# Patient Record
(demographics unavailable — no encounter records)

---

## 2025-02-27 NOTE — REASON FOR VISIT
[Subsequent Evaluation] : a subsequent evaluation for [Father] : father [Other: ______] : provided by J[facility]ENT[facility] [FreeTextEntry2] : left otorrhea  [Interpreters_IDNumber] : [id number] [Interpreters_FullName] : Solange [TWNoteComboBox1] : Lebanese

## 2025-02-27 NOTE — PROCEDURE
[FreeTextEntry1] : ginny [FreeTextEntry2] : same [FreeTextEntry3] : Operative microscope was used to examine the ear canal, ear drum and visible middle ear landmarks. Adequate exam would not have been possible without the use of a microscope. Findings are described.

## 2025-02-27 NOTE — HISTORY OF PRESENT ILLNESS
[de-identified] : 9 year old boy presents for follow-up for left otorrhea Hx of hearing loss, ASD, dev delay s/p lorenza CIs at age 2 -- goes to [employer]  Last seen in September for left otorrhea - previously cultures without growth.  Reports continued left otorrhea. No current use of drops. Cleaning with qtip after showering. Denies pain and fever. Dad feels that Leonel is hearing well with use of hearing aids. CT Scan 06/02/24: IMPRESSION: Dense posterior interhemispheric falx as described above maybe artifactual or indicative of a small amount of posterior. interhemispheric subdural hemorrhage

## 2025-02-27 NOTE — CONSULT LETTER
[Dear  ___] : Dear  [unfilled], [Consult Letter:] : I had the pleasure of evaluating your patient, [unfilled]. [Please see my note below.] : Please see my note below. [Consult Closing:] : Thank you very much for allowing me to participate in the care of this patient.  If you have any questions, please do not hesitate to contact me. [Sincerely,] : Sincerely, [FreeTextEntry2] : Dr. Callaway -[phone] [FreeTextEntry3] : Susannah Galeas MD Otology, Neurotology and Skull Base Surgery

## 2025-02-27 NOTE — PHYSICAL EXAM
[Clear to Auscultation] : lungs were clear to auscultation bilaterally [Normal Gait and Station] : normal gait and station [Normal muscle strength, symmetry and tone of facial, head and neck musculature] : normal muscle strength, symmetry and tone of facial, head and neck musculature [Normal] : no cervical lymphadenopathy [Exposed Vessel] : left anterior vessel not exposed [Wheezing] : no wheezing [Increased Work of Breathing] : no increased work of breathing with use of accessory muscles and retractions [FreeTextEntry7] : no skin breakdown or erythema/purulence [FreeTextEntry9] : medial/anterior exudate, cleared and placed topical treatment [de-identified] : opaque, desquamating

## 2025-03-27 NOTE — PHYSICAL EXAM
[Exposed Vessel] : left anterior vessel not exposed [Clear to Auscultation] : lungs were clear to auscultation bilaterally [Wheezing] : no wheezing [Increased Work of Breathing] : no increased work of breathing with use of accessory muscles and retractions [Normal Gait and Station] : normal gait and station [Normal muscle strength, symmetry and tone of facial, head and neck musculature] : normal muscle strength, symmetry and tone of facial, head and neck musculature [Normal] : no cervical lymphadenopathy [FreeTextEntry7] : no skin breakdown or erythema/purulence [FreeTextEntry9] : medial/anterior exudate, cleared and placed topical treatment [de-identified] : opaque, desquamating

## 2025-03-27 NOTE — ASSESSMENT
[FreeTextEntry1] : EQUIPMENT: Bilaterally Implanted : Cochlear Internal:  (right),  (left) Implant date: 6/2/17 (right), 4/6/18 (left) Surgeon: Dr. Galeas Processor type(s): currently wearing N8 on right ear and N7 on left ear (has one Kanso and one N6 processor as back-up) Magnet strength: 1/2 (father has strength 1 magnets if needed) Parent aware to routinely monitor magnet site and to report any redness/tenderness/headache to clinician.  Advised routine change of microphone covers ~ every 3 months. Father indicates he has replacements at home and will change them routinely.   MAPPING: RIGHT: -Impedances stable and WNL on all electrodes -Data logging: avg 10.7 hours per day in all listening situations -eSRT performed today (with probe tip in right ear- current left otorrhea). eSRT response seen on E22, 16, 11 and 6 where C levels set on MAP71 (saved as reference). -ACE, MP1+2, 900Hz, Maxima 10, PW25 (all electrodes active) -MAP73: C levels +5CU E11-22 for C levels to be closer to goal range re: eSRT measurements. E16 and 22 -25CU below eSRT levels, remaining C levels within goal range. No change to T levels- adequate functional gain obtained previously, confirming appropriately set T levels.   -SCAN2, volume 6, sensitivity 12, soft start 2 seconds -Auto processor off enabled  LEFT: -Impedances stable and WNL on all electrodes -Data logging: avg 10.8 hours per day in all listening situations -eSRT performed today (with probe tip in right ear- current left otorrhea). eSRT response seen on E16, 11 and 6 where C levels set on MAP70 (saved as reference). -ACE, MP1+2, 900Hz, Maxima 9, PW50 (all electrodes active) -MAP69: No change to MAP levels. C levels at or close to eSRT levels obtained today; adequate functional gain obtained previously, confirming appropriately set T levels.   -SCAN2, volume 6, sensitivity 12, soft start 2 seconds -Auto processor off enabled  PLAN: Functional gain testing, MAP adjustments as needed

## 2025-03-27 NOTE — PLAN
[FreeTextEntry2] : 1) Continued otologic monitoring 2) Continued consistent bilateral cochlear implant use 3) Continued educational support services 4) Routine annual mapping, sooner if needed (father prefers routine 6-month mappings)

## 2025-03-27 NOTE — HISTORY OF PRESENT ILLNESS
[FreeTextEntry1] : 9-year-old male implanted bilaterally at 26 months of age. Complicated medical history includes NICU stay and respiratory distress at birth (on ECMO, required CPAP, chest tubes, and intubation), atrial septal defect, global developmental delay, pulmonary hypertension, hypotonia, macrocephaly, plagiocephaly. Per parents, ultrasound and MRI indicated "fluid on the brain"- followed by Neurology. Per parents, Leonel passed the  hearing screening; diagnosed with bilateral profound SNHL via sedated ABR in the OR post BMT on 16. Leonel was fit with binaural hearing aids at our Center on 16. He was implanted with internal  on 17 in the right ear and with internal  in the left ear on 18 by Dr. Galeas. Attending [employer].   [FreeTextEntry8] : Patient returns today for routine mapping following appointment with Dr. Galeas today. Father reports continued left ear infection/otorrhea. Doing well with cochlear implants, wearing consistently and no issues with equipment.

## 2025-03-27 NOTE — REASON FOR VISIT
[Follow-Up] : follow-up for [Cochlear Implant] : cochlear implant [Father] : father [Language Line ] : provided by [employer]   [Time Spent: ____ minutes] : Total time spent using  services: [unfilled] minutes. The patient's primary language is not English thus required  services. [Interpreters_IDNumber] : [id number] [Interpreters_FullName] : Popeye [TWNoteComboBox1] : British

## 2025-05-06 NOTE — REASON FOR VISIT
[Subsequent Evaluation] : a subsequent evaluation for [Father] : father [Language [employer] ] : provided by [employer]   [FreeTextEntry2] : left otorrhea. [Interpreters_IDNumber] : [id number] [Interpreters_FullName] : Troy  [TWNoteComboBox1] : [place]arag[place]

## 2025-05-06 NOTE — PHYSICAL EXAM
[Clear to Auscultation] : lungs were clear to auscultation bilaterally [Normal Gait and Station] : normal gait and station [Normal muscle strength, symmetry and tone of facial, head and neck musculature] : normal muscle strength, symmetry and tone of facial, head and neck musculature [Normal] : no cervical lymphadenopathy [Exposed Vessel] : left anterior vessel not exposed [Wheezing] : no wheezing [Increased Work of Breathing] : no increased work of breathing with use of accessory muscles and retractions [FreeTextEntry7] : no skin breakdown or erythema/purulence [FreeTextEntry9] : medial/anterior exudate, cleared and placed topical treatment [de-identified] : opaque, desquamating, anterior retraction

## 2025-05-06 NOTE — HISTORY OF PRESENT ILLNESS
[de-identified] : 10 year old boy presents with follow up for left otorrhea  h/o lorenza SNHL s/p lorenza CI, doing well, verbal and goes to [employer].  pt had a h/o otorrhea that required an EUA which was essentially benign apart from granulation at TM. CT confirms dz confined to L EAC. cultures with pseudomonas, pansensitive. placed gelfoam.  He was prescribed ciprodex drops for 2 weeks and completed course of treatment  Father states left otorrhea is still present but now clear in color when previously it was brown  Patient denies otalgia, fevers and nasal congestion

## 2025-06-10 NOTE — ASSESSMENT
[FreeTextEntry1] : debrided polyp of TM/ear canal intraop cultures with pansensitive pseudomonas still with residual polyp, treated topically f/u 3 mos

## 2025-06-10 NOTE — REASON FOR VISIT
[de-identified] : s/p Left ear canal debridement with removal and excision of polyp and microscope use.   [de-identified] : 05/21/25